# Patient Record
Sex: FEMALE
[De-identification: names, ages, dates, MRNs, and addresses within clinical notes are randomized per-mention and may not be internally consistent; named-entity substitution may affect disease eponyms.]

---

## 2023-06-05 PROBLEM — Z00.00 ENCOUNTER FOR PREVENTIVE HEALTH EXAMINATION: Status: ACTIVE | Noted: 2023-06-05

## 2023-06-06 ENCOUNTER — APPOINTMENT (OUTPATIENT)
Dept: PULMONOLOGY | Facility: CLINIC | Age: 68
End: 2023-06-06
Payer: MEDICARE

## 2023-06-06 VITALS
TEMPERATURE: 97.3 F | BODY MASS INDEX: 28.32 KG/M2 | OXYGEN SATURATION: 100 % | DIASTOLIC BLOOD PRESSURE: 74 MMHG | RESPIRATION RATE: 17 BRPM | HEART RATE: 69 BPM | SYSTOLIC BLOOD PRESSURE: 137 MMHG | HEIGHT: 61 IN | WEIGHT: 150 LBS

## 2023-06-06 DIAGNOSIS — E03.9 HYPOTHYROIDISM, UNSPECIFIED: ICD-10-CM

## 2023-06-06 DIAGNOSIS — R05.8 OTHER SPECIFIED COUGH: ICD-10-CM

## 2023-06-06 DIAGNOSIS — I10 ESSENTIAL (PRIMARY) HYPERTENSION: ICD-10-CM

## 2023-06-06 DIAGNOSIS — E11.9 TYPE 2 DIABETES MELLITUS W/OUT COMPLICATIONS: ICD-10-CM

## 2023-06-06 DIAGNOSIS — Z87.898 PERSONAL HISTORY OF OTHER SPECIFIED CONDITIONS: ICD-10-CM

## 2023-06-06 DIAGNOSIS — E78.00 PURE HYPERCHOLESTEROLEMIA, UNSPECIFIED: ICD-10-CM

## 2023-06-06 DIAGNOSIS — K21.9 GASTRO-ESOPHAGEAL REFLUX DISEASE W/OUT ESOPHAGITIS: ICD-10-CM

## 2023-06-06 DIAGNOSIS — Z82.49 FAMILY HISTORY OF ISCHEMIC HEART DISEASE AND OTHER DISEASES OF THE CIRCULATORY SYSTEM: ICD-10-CM

## 2023-06-06 DIAGNOSIS — Z83.49 FAMILY HISTORY OF OTHER ENDOCRINE, NUTRITIONAL AND METABOLIC DISEASES: ICD-10-CM

## 2023-06-06 PROCEDURE — 99204 OFFICE O/P NEW MOD 45 MIN: CPT | Mod: 25

## 2023-06-06 PROCEDURE — ZZZZZ: CPT

## 2023-06-06 PROCEDURE — 94726 PLETHYSMOGRAPHY LUNG VOLUMES: CPT

## 2023-06-06 PROCEDURE — 94010 BREATHING CAPACITY TEST: CPT

## 2023-06-06 PROCEDURE — 94729 DIFFUSING CAPACITY: CPT

## 2023-06-06 RX ORDER — GLIPIZIDE 5 MG/1
5 TABLET, FILM COATED, EXTENDED RELEASE ORAL
Refills: 0 | Status: ACTIVE | COMMUNITY

## 2023-06-06 RX ORDER — ATORVASTATIN CALCIUM 10 MG/1
10 TABLET, FILM COATED ORAL
Refills: 0 | Status: ACTIVE | COMMUNITY

## 2023-06-06 RX ORDER — ENALAPRIL MALEATE 5 MG/1
5 TABLET ORAL
Refills: 0 | Status: ACTIVE | COMMUNITY

## 2023-06-06 RX ORDER — OMEPRAZOLE 40 MG/1
40 CAPSULE, DELAYED RELEASE ORAL
Refills: 0 | Status: ACTIVE | COMMUNITY

## 2023-06-06 RX ORDER — FLUTICASONE PROPIONATE 50 UG/1
50 SPRAY, METERED NASAL TWICE DAILY
Qty: 1 | Refills: 5 | Status: ACTIVE | COMMUNITY
Start: 2023-06-06 | End: 1900-01-01

## 2023-06-06 RX ORDER — SODIUM CHLORIDE 0.65 %
0.65 AEROSOL, SPRAY (ML) NASAL
Qty: 1 | Refills: 3 | Status: ACTIVE | COMMUNITY
Start: 2023-06-06 | End: 1900-01-01

## 2023-06-06 RX ORDER — LEVOTHYROXINE SODIUM 0.03 MG/1
25 TABLET ORAL
Refills: 0 | Status: ACTIVE | COMMUNITY

## 2023-06-06 RX ORDER — ASPIRIN 81 MG
81 TABLET, DELAYED RELEASE (ENTERIC COATED) ORAL
Refills: 0 | Status: ACTIVE | COMMUNITY

## 2023-06-07 ENCOUNTER — TRANSCRIPTION ENCOUNTER (OUTPATIENT)
Age: 68
End: 2023-06-07

## 2023-06-08 PROBLEM — Z87.898 HISTORY OF DRY MOUTH: Status: ACTIVE | Noted: 2023-06-08

## 2023-06-08 PROBLEM — E11.9 DM2 (DIABETES MELLITUS, TYPE 2): Status: ACTIVE | Noted: 2023-06-06

## 2023-06-08 PROBLEM — E78.00 HYPERCHOLESTEROLEMIA: Status: ACTIVE | Noted: 2023-06-06

## 2023-06-08 PROBLEM — I10 BENIGN ESSENTIAL HYPERTENSION: Status: ACTIVE | Noted: 2023-06-06

## 2023-06-08 PROBLEM — Z82.49 FAMILY HISTORY OF HEART ATTACK: Status: ACTIVE | Noted: 2023-06-06

## 2023-06-08 PROBLEM — Z83.49 FAMILY HISTORY OF THYROID DISEASE: Status: ACTIVE | Noted: 2023-06-06

## 2023-06-08 NOTE — PHYSICAL EXAM
[No Acute Distress] : no acute distress [Normal Oropharynx] : normal oropharynx [Normal Appearance] : normal appearance [No Neck Mass] : no neck mass [Normal Rate/Rhythm] : normal rate/rhythm [Normal S1, S2] : normal s1, s2 [No Murmurs] : no murmurs [No Resp Distress] : no resp distress [Clear to Auscultation Bilaterally] : clear to auscultation bilaterally [No Abnormalities] : no abnormalities [Benign] : benign [Normal Gait] : normal gait [No Clubbing] : no clubbing [No Cyanosis] : no cyanosis [FROM] : FROM [Normal Color/ Pigmentation] : normal color/ pigmentation [No Focal Deficits] : no focal deficits [Oriented x3] : oriented x3 [Normal Affect] : normal affect [Well Nourished] : well nourished [Well Developed] : well developed [Supple] : supple [No JVD] : no jvd [Normal Pulses] : normal pulses [No Edema] : no edema [No Acc Muscle Use] : no acc muscle use [Not Tender] : not tender [Soft] : soft [Cranial Nerves Intact] : cranial nerves intact [No Motor Deficits] : no motor deficits

## 2023-06-08 NOTE — HISTORY OF PRESENT ILLNESS
[Never] : never [TextBox_4] : Mrs. Campbell is a 67 year-old female with a history of HTN, HLD, DM2 (not insulin dependent), hypothyroidism, and GERD who presents for evaluation. She reports a chronic cough that has been present for 6-8 months. Cough is not productive. Cough is worse in the morning upon awakening, but also present at night. Denies any dyspnea or wheezing. No fevers or chills. No chest pain or tightness. She has a history of GERD for which she takes omeprazole. Denies frequent throat clearing. \par \par She reports nocturnal dry mouth that requires her to drink water during the night.  denies that she snores significantly and no witnessed apneas. No choking episodes. No morning headache. Reports nonrestorative sleep due to insufficient sleep (4-5 hours). Reports excessive daytime somnolence.\par \par CXR (April 2023) with clear lungs. No consolidation or pneumothorax. No pleural effusion. There are no suspicious pulmonary nodules. There are 2 tiny nodular densities in the right upper lobe which are unchanged in consistent with granulomata.\par

## 2023-06-08 NOTE — ASSESSMENT
[FreeTextEntry1] : Mrs. Campbell is a 67 year-old female with a history of HTN, HLD, DM2 (not insulin dependent), hypothyroidism, and GERD who presents for evaluation. She reports a chronic cough that has been present for 6-8 months. Cough is not productive. Cough is worse in the morning upon awakening, but also present at night. Denies any dyspnea or wheezing. No fevers or chills. No chest pain or tightness. She has a history of GERD for which she takes omeprazole. Denies frequent throat clearing. She reports nocturnal dry mouth that requires her to drink water during the night.  denies that she snores significantly and no witnessed apneas. No choking episodes. No morning headache. Reports nonrestorative sleep due to insufficient sleep (4-5 hours). Reports excessive daytime somnolence.\par \par CXR (April 2023) with clear lungs. No consolidation or pneumothorax. No pleural effusion. There are no suspicious pulmonary nodules. There are 2 tiny nodular densities in the right upper lobe which are unchanged in consistent with granulomata.\par \par PFTs (June 2023) with normal spirometry, lung volumes, and diffusing capacity.\par \par Assessment:\par Upper airway cough syndrome\par Chronic GERD\par Nocturnal dry mouth\par \par Plan:\par - PFTs with normal spirometry, lung volumes, and diffusing capacity\par - Start fluticasone nasal spray 1 spray per nostril twice daily \par - Start Ocean nasal saline spray 1-2 sprays per nostril prior to sleeping and prn day\par - Continue omeprazole\par - Avoid spicy foods/drinks, citrus foods/drinks, tomato-based foods/sauces, caffeine, chocolate, late evening meals, carbonated beverages, alcohol, and fatty foods\par - Family requesting to check quantiferon gold TB, which is negative\par - Consider sleep study next visit if continues to experience nocturnal dry mouth, snoring, choking episodes, and nonrestorative sleep\par - Follow-up in 3 months or sooner prn

## 2023-06-08 NOTE — CONSULT LETTER
[Dear  ___] : Dear  [unfilled], [Consult Letter:] : I had the pleasure of evaluating your patient, [unfilled]. [Please see my note below.] : Please see my note below. [Consult Closing:] : Thank you very much for allowing me to participate in the care of this patient.  If you have any questions, please do not hesitate to contact me. [Sincerely,] : Sincerely, [FreeTextEntry2] : Dr. Rancho Espinoza MD\par Fax: [phone] [FreeTextEntry3] : Chele Duran MD\par Attending Physician in Pulmonary & Critical Care Medicine\par  of Medicine\par [employer] [facility]

## 2023-06-09 LAB
M TB IFN-G BLD-IMP: NEGATIVE
QUANTIFERON TB PLUS MITOGEN MINUS NIL: >10 IU/ML
QUANTIFERON TB PLUS NIL: 0.03 IU/ML
QUANTIFERON TB PLUS TB1 MINUS NIL: 0.07 IU/ML
QUANTIFERON TB PLUS TB2 MINUS NIL: 0.05 IU/ML